# Patient Record
Sex: MALE
[De-identification: names, ages, dates, MRNs, and addresses within clinical notes are randomized per-mention and may not be internally consistent; named-entity substitution may affect disease eponyms.]

---

## 2021-12-01 NOTE — NUR
Pt ready for discharge.  2nd EKG was obtained.  Pt has remained in SR with 1st
deg AV block.  Pt remains awake and alert, verbalizes understanding of
discharge and follow up instructions.  IV dc'd with cath intact, dressing
applied.  amb around nurses station with steady gait.  to exit via wheelchair.

## 2021-12-01 NOTE — NUR
PT care assumed from Zuri RN.  Pt is awake and alert, NSR on monitor,
telemetry initiated.  Friend Leonard is at bs.  I discussed discharge plan and
instructions at this time, and plan to again prior to discharge.  pt given
water, no trouble swallowing.

## 2022-06-21 ENCOUNTER — HOSPITAL ENCOUNTER (OUTPATIENT)
Dept: HOSPITAL 19 - COL.CARD | Age: 52
End: 2022-06-21
Attending: INTERNAL MEDICINE
Payer: MEDICAID

## 2022-06-21 VITALS — HEIGHT: 73.05 IN | WEIGHT: 216.71 LBS | BODY MASS INDEX: 28.41 KG/M2

## 2022-06-21 VITALS — DIASTOLIC BLOOD PRESSURE: 98 MMHG | HEART RATE: 75 BPM | SYSTOLIC BLOOD PRESSURE: 172 MMHG

## 2022-06-21 VITALS — SYSTOLIC BLOOD PRESSURE: 183 MMHG | DIASTOLIC BLOOD PRESSURE: 94 MMHG | HEART RATE: 77 BPM

## 2022-06-21 VITALS — SYSTOLIC BLOOD PRESSURE: 168 MMHG | DIASTOLIC BLOOD PRESSURE: 100 MMHG | TEMPERATURE: 98.4 F | HEART RATE: 56 BPM

## 2022-06-21 VITALS — SYSTOLIC BLOOD PRESSURE: 153 MMHG | HEART RATE: 72 BPM | DIASTOLIC BLOOD PRESSURE: 90 MMHG

## 2022-06-21 VITALS — HEART RATE: 76 BPM | SYSTOLIC BLOOD PRESSURE: 172 MMHG | DIASTOLIC BLOOD PRESSURE: 100 MMHG

## 2022-06-21 VITALS — DIASTOLIC BLOOD PRESSURE: 97 MMHG | SYSTOLIC BLOOD PRESSURE: 192 MMHG | HEART RATE: 59 BPM

## 2022-06-21 DIAGNOSIS — R07.9: Primary | ICD-10-CM

## 2022-06-21 PROCEDURE — A9500 TC99M SESTAMIBI: HCPCS
